# Patient Record
(demographics unavailable — no encounter records)

---

## 2025-07-02 NOTE — DISCUSSION/SUMMARY
[Medication Risks Reviewed] : Medication risks reviewed [de-identified] : We discussed formal PT, a home exercise program, ice therapy and the role of NSAIDS for the pain. These modalities will improve the patient's functionality in their activities of daily life.  Risks, benefits and contraindications were discussed.  The patient will follow up in 6 weeks or sooner as needed.

## 2025-07-02 NOTE — PHYSICAL EXAM
[4___] : hamstring 4[unfilled]/5 [Right] : right knee [Lateral] : lateral [[place]] : skyline [AP Standing] : anteroposterior standing [There are no fractures, subluxations or dislocations. No significant abnormalities are seen] : There are no fractures, subluxations or dislocations. No significant abnormalities are seen [] : no extensor lag [[id number]] : flexion 120 degrees

## 2025-07-02 NOTE — HISTORY OF PRESENT ILLNESS
[de-identified] : Patient reports a lumbar fusion about 4 years ago. She notes pain that starts at the lateral hip and radiates into the medial knee. She also notes pain with use of stairs and getting up from a chair, as well as with prolonged standing and lying in bed. She takes Tylenol for the pain.